# Patient Record
Sex: MALE | Race: WHITE | NOT HISPANIC OR LATINO | Employment: FULL TIME | URBAN - METROPOLITAN AREA
[De-identification: names, ages, dates, MRNs, and addresses within clinical notes are randomized per-mention and may not be internally consistent; named-entity substitution may affect disease eponyms.]

---

## 2018-01-16 ENCOUNTER — TELEPHONE (OUTPATIENT)
Dept: FAMILY MEDICINE | Facility: CLINIC | Age: 28
End: 2018-01-16

## 2018-01-16 ENCOUNTER — NURSE TRIAGE (OUTPATIENT)
Dept: NURSING | Facility: CLINIC | Age: 28
End: 2018-01-16

## 2018-01-16 NOTE — TELEPHONE ENCOUNTER
Patient calling wants to be seen for breathing problem,fast heart rate. Triage not available in clinic caller sent to [facility].

## 2018-01-16 NOTE — TELEPHONE ENCOUNTER
"  Reason for Disposition    [1] Palpitations AND [2] no improvement after following Care Advice    Additional Information    Negative: Passed out (i.e., lost consciousness, collapsed and was not responding)    Negative: Shock suspected (e.g., cold/pale/clammy skin, too weak to stand, low BP, rapid pulse)    Negative: Difficult to awaken or acting confused  (e.g., disoriented, slurred speech)    Negative: Visible sweat on face or sweat dripping down face    Negative: Unable to walk, or can only walk with assistance (e.g., requires support)    Negative: [1] Received SHOCK from implantable cardiac defibrillator AND [2] persisting symptoms (i.e., palpitations, lightheadedness)    Negative: Sounds like a life-threatening emergency to the triager    Negative: Chest pain    Negative: Difficulty breathing    Negative: Dizziness, lightheadedness, or weakness    Negative: [1] Heart beating very rapidly (e.g., > 140 / minute) AND [2] present now  (Exception: during exercise)    Negative: Heart beating very slowly (e.g., < 50 / minute)  (Exception: athlete)    Negative: New or worsened shortness of breath with activity (dyspnea on exertion)    Negative: Patient sounds very sick or weak to the triager    Negative: [1] Heart beating very rapidly (e.g., > 140 / minute) AND [2] not present now  (Exception: during exercise)    Negative: [1] Skipped or extra beat(s) AND [2] increases with exercise or exertion    Negative: [1] Skipped or extra beat(s) AND [2] occurs 4 or more times per minute    Negative: New or worsened ankle swelling    Negative: History of heart disease  (i.e., heart attack, bypass surgery, angina, angioplasty, CHF) (Exception: brief heart beat symptoms that went away and now feels well)    Negative: Age > 60 years (Exception: brief heart beat symptoms that went away and now feels well)    Negative: Taking water pill (i.e., diuretic) or heart medication (e.g., digoxin)    Negative: Wearing a \"holter monitor\" or " "\"cardiac event monitor\"    Negative: [1] Received SHOCK from implantable cardiac defibrillator AND [2] now feels well    Negative: History of hyperthyroidism or taking thyroid medication    Negative: Known or suspected substance abuse (e.g., cocaine, alcohol abuse)    Protocols used: HEART RATE AND HEARTBEAT QUESTIONS-ADULT-    "

## 2018-01-16 NOTE — TELEPHONE ENCOUNTER
"\"I have a weird and erratic heart rate. My breathing has been short.\" Patient reporting, \"I have been getting erratic with mood.\" Reporting \"anxiety symptoms.\"  Stating blood pressure was 140/90 and 120/60 at clinic in [place] in past month. Symptoms starting in past 2-3 months. Patient reporting episodes of irregular heart rate intermittent x 2-3 months, intermittent occurring a couple of times per day.  Reporting when laying down he can feel heart beating in head. Stating he has not counted the heart rate. Denies any current symptoms.  Stating he is over weight and out of shape. Reporting \"normal\" shortness of breath with exertion that he attributes to being out of shape.  Patient is questioning \"Graves Disease\" reporting family history. Stating he has increased water intake.    Vania Layne RN  [facility]      "

## 2018-01-19 ENCOUNTER — OFFICE VISIT (OUTPATIENT)
Dept: FAMILY MEDICINE | Facility: CLINIC | Age: 28
End: 2018-01-19
Payer: COMMERCIAL

## 2018-01-19 VITALS
WEIGHT: 310 LBS | OXYGEN SATURATION: 99 % | RESPIRATION RATE: 18 BRPM | SYSTOLIC BLOOD PRESSURE: 139 MMHG | HEART RATE: 80 BPM | TEMPERATURE: 97.7 F | DIASTOLIC BLOOD PRESSURE: 83 MMHG | BODY MASS INDEX: 43.85 KG/M2

## 2018-01-19 DIAGNOSIS — Z83.49 FAMILY HISTORY OF GRAVES' DISEASE: ICD-10-CM

## 2018-01-19 DIAGNOSIS — R00.2 PALPITATIONS: Primary | ICD-10-CM

## 2018-01-19 LAB
ERYTHROCYTE [DISTWIDTH] IN BLOOD BY AUTOMATED COUNT: 12.7 % (ref 10–15)
HCT VFR BLD AUTO: 41.5 % (ref 40–53)
HGB BLD-MCNC: 14.3 G/DL (ref 13.3–17.7)
MCH RBC QN AUTO: 30.1 PG (ref 26.5–33)
MCHC RBC AUTO-ENTMCNC: 34.5 G/DL (ref 31.5–36.5)
MCV RBC AUTO: 87 FL (ref 78–100)
PLATELET # BLD AUTO: 223 10E9/L (ref 150–450)
RBC # BLD AUTO: 4.75 10E12/L (ref 4.4–5.9)
TSH SERPL DL<=0.005 MIU/L-ACNC: 1.98 MU/L (ref 0.4–4)
WBC # BLD AUTO: 4.8 10E9/L (ref 4–11)

## 2018-01-19 PROCEDURE — 84443 ASSAY THYROID STIM HORMONE: CPT | Performed by: PHYSICIAN ASSISTANT

## 2018-01-19 PROCEDURE — 85027 COMPLETE CBC AUTOMATED: CPT | Performed by: PHYSICIAN ASSISTANT

## 2018-01-19 PROCEDURE — 0296T ZIO PATCH HOLTER: CPT | Performed by: PHYSICIAN ASSISTANT

## 2018-01-19 PROCEDURE — 36415 COLL VENOUS BLD VENIPUNCTURE: CPT | Performed by: PHYSICIAN ASSISTANT

## 2018-01-19 PROCEDURE — 99214 OFFICE O/P EST MOD 30 MIN: CPT | Mod: 25 | Performed by: PHYSICIAN ASSISTANT

## 2018-01-19 PROCEDURE — 93000 ELECTROCARDIOGRAM COMPLETE: CPT | Mod: 59 | Performed by: PHYSICIAN ASSISTANT

## 2018-01-19 ASSESSMENT — ANXIETY QUESTIONNAIRES
GAD7 TOTAL SCORE: 12
7. FEELING AFRAID AS IF SOMETHING AWFUL MIGHT HAPPEN: NOT AT ALL
6. BECOMING EASILY ANNOYED OR IRRITABLE: NEARLY EVERY DAY
3. WORRYING TOO MUCH ABOUT DIFFERENT THINGS: MORE THAN HALF THE DAYS
2. NOT BEING ABLE TO STOP OR CONTROL WORRYING: MORE THAN HALF THE DAYS
1. FEELING NERVOUS, ANXIOUS, OR ON EDGE: MORE THAN HALF THE DAYS
IF YOU CHECKED OFF ANY PROBLEMS ON THIS QUESTIONNAIRE, HOW DIFFICULT HAVE THESE PROBLEMS MADE IT FOR YOU TO DO YOUR WORK, TAKE CARE OF THINGS AT HOME, OR GET ALONG WITH OTHER PEOPLE: SOMEWHAT DIFFICULT
5. BEING SO RESTLESS THAT IT IS HARD TO SIT STILL: MORE THAN HALF THE DAYS

## 2018-01-19 ASSESSMENT — PATIENT HEALTH QUESTIONNAIRE - PHQ9
5. POOR APPETITE OR OVEREATING: SEVERAL DAYS
SUM OF ALL RESPONSES TO PHQ QUESTIONS 1-9: 12

## 2018-01-19 NOTE — PATIENT INSTRUCTIONS
If any results are abnormal and require treatment, a nurse will contact you.    If all results are normal, a letter will be sent to you.    If your anxiety symptoms persist, please return to the clinic to discuss treatment options.    Return sooner if any concerns.

## 2018-01-19 NOTE — LETTER
February 5, 2018    Neeraj Prieto  [address] [address]            Dear Neeraj,    Your recent heart monitoring showed no dangerous arrhythmias. Your heart rhythm was normal throughout the 3 days. No further evaluation of your heart is needed at this time. However, if you develop any new symptoms, please follow up with your primary care provider as soon as possible. If you develop any chest pain, shortness of breath, dizziness, or any other concerning symptoms, please go to the Emergency Department for evaluation. Please call the clinic if you have any questions. Thank you. .      Sincerely,    Christa Zaman PA-C/ks    Results for orders placed or performed in visit on 01/19/18   TSH with free T4 reflex   Result Value Ref Range    TSH 1.98 0.40 - 4.00 mU/L   CBC with platelets   Result Value Ref Range    WBC 4.8 4.0 - 11.0 10e9/L    RBC Count 4.75 4.4 - 5.9 10e12/L    Hemoglobin 14.3 13.3 - 17.7 g/dL    Hematocrit 41.5 40.0 - 53.0 %    MCV 87 78 - 100 fl    MCH 30.1 26.5 - 33.0 pg    MCHC 34.5 31.5 - 36.5 g/dL    RDW 12.7 10.0 - 15.0 %    Platelet Count 223 150 - 450 10e9/L   Zio Patch Holter    Narrative    Patient to wear Zio for 3 days per Christa Zaman PA-C. Pt was instructed on use and how to put the patch on. Pt will be applying patch themselves on Monday night, 01/22/2018.  Jocy Wilkerson MA

## 2018-01-19 NOTE — PROGRESS NOTES
SUBJECTIVE:   Neeraj Prieto is a 27 year old male who presents to clinic today for the following health issues:      Patient c/o irregular pulse, shallow breath, feels like someone is choking him X 6-8 months. Patient also has concerns of anxiety. Patient states family hx of Graves disease.    Irregular heartbeat - occurs when he is laying down to go to sleep. Denies chest pain or dizziness. Denies irregular heartbeat occurring with exertion.    Breathing - was on symbicort in his teenage years, but did not help so stopped. He is using his albuterol inhaler once daily, but denies significant improvement of his breathing symptoms. Describes symptoms as taking short breaths. Intermittent wheezing and slight cough. Notice the breathing when he lays down to go to sleep.     Anxiety - increased stress worsens his anxiety. He is unsure if he has had a panic attack - but has felt panicky. He does worry about money. His brain will not turn off at night. He declines therapy or medication at this time. He wants to first be evaluated for his palpitations and breathing issues. If symptoms of anxiety persist or worsen, he will return to the clinic. Advised to go to local Emergency Department if any thoughts of self harm or harming others. He understood and agreed. He denies any thoughts of self harm or harming others.     He is correlating his anxiety with his breathing symptoms and irregular heartbeat.     He would like to be checked for Grave's disease due to family history. He has heat intolerance. Denies weight changes. Denies skin or hair changes. He denies any other concerns.        Problem list and histories reviewed & adjusted, as indicated.  Additional history: as documented    Patient Active Problem List   Diagnosis     House dust mite allergy     Exercise-induced bronchospasm     Intermittent asthma     Sleep apnea     Past Surgical History:   Procedure Laterality Date     none         Social History   Substance Use  Topics     Smoking status: Never Smoker     Smokeless tobacco: Never Used      Comment: no 2nd hand smoke exposure     Alcohol use Yes      Comment: occasionally     Family History   Problem Relation Age of Onset     Asthma Mother      Allergies Mother          Current Outpatient Prescriptions   Medication Sig Dispense Refill     albuterol (VENTOLIN HFA) 108 (90 BASE) MCG/ACT inhaler Inhale 2 puffs into the lungs every 4 hours as needed 2 Inhaler 1     cefdinir (OMNICEF) 300 MG capsule Take 1 capsule (300 mg) by mouth 2 times daily (Patient not taking: Reported on 1/19/2018) 20 capsule 0     predniSONE (DELTASONE) 20 MG tablet        loratadine (CLARITIN) 10 MG tablet Take 1 tablet (10 mg) by mouth daily (Patient not taking: Reported on 1/19/2018) 30 tablet 11     budesonide-formoterol (SYMBICORT) 160-4.5 MCG/ACT inhaler Inhale 2 puffs into the lungs 2 times daily (Patient not taking: Reported on 1/19/2018) 1 Inhaler 4     ibuprofen (ADVIL) 200 MG tablet Take 400 mg by mouth every 4 hours as needed       budesonide-formoterol (SYMBICORT) 80-4.5 MCG/ACT inhaler Inhale 2 puffs into the lungs 2 times daily (Patient not taking: Reported on 1/19/2018) 1 Inhaler 3     Allergies   Allergen Reactions     Nkda [No Known Drug Allergies]      BP Readings from Last 3 Encounters:   01/19/18 139/83   07/07/16 136/84   07/07/14 120/76    Wt Readings from Last 3 Encounters:   01/19/18 (!) 310 lb (140.6 kg)   07/07/16 298 lb (135.2 kg)   07/07/14 298 lb (135.2 kg)                        Reviewed and updated as needed this visit by clinical staffTobacco  Allergies  Meds  Med Hx  Surg Hx  Fam Hx  Soc Hx      Reviewed and updated as needed this visit by Provider         ROS:  Constitutional, HEENT, cardiovascular, pulmonary, endocrine and psychiatric systems are negative, except as otherwise noted.      OBJECTIVE:   /83  Pulse 80  Temp 97.7  F (36.5  C) (Oral)  Resp 18  Wt (!) 310 lb (140.6 kg)  SpO2 99%  BMI 43.85  kg/m2  Body mass index is 43.85 kg/(m^2).  GENERAL: healthy, alert and no distress  EYES: Eyes grossly normal to inspection  HENT: normal cephalic/atraumatic, ear canals and TM's normal, nose and mouth without ulcers or lesions, oropharynx clear and oral mucous membranes moist  NECK: no adenopathy, no asymmetry, masses, or scars and thyroid normal to palpation  RESP: lungs clear to auscultation - no rales, rhonchi or wheezes  CV: regular rate and rhythm, normal S1 S2, no S3 or S4, no murmur, click or rub, no peripheral edema and peripheral pulses strong  MS: no gross musculoskeletal defects noted, no edema  SKIN: no suspicious lesions or rashes  NEURO: Normal strength and tone, mentation intact and speech normal  PSYCH: mentation appears normal, affect normal/bright    Diagnostic Test Results:  EKG - NSR, no acute ST changes, no previous tracings available  CBC, TSH and holter monitor are pending    ASSESSMENT/PLAN:       ICD-10-CM    1. Palpitations R00.2 EKG 12-lead complete w/read - Clinics     TSH with free T4 reflex     CBC with platelets     Zio Patch Holter     Zio Patch Holter   2. Family history of Graves' disease Z83.49 TSH with free T4 reflex         Patient Instructions   If any results are abnormal and require treatment, a nurse will contact you.    If all results are normal, a letter will be sent to you.    If your anxiety symptoms persist, please return to the clinic to discuss treatment options.    Return sooner if any concerns.           Christa Zaman PA-C  [facility]

## 2018-01-19 NOTE — LETTER
January 23, 2018    Neeraj Prieto  [address] [address]        Dear Neeraj,    The results of your recent tests were normal.  Below is a copy of the results.  It was a pleasure to see you at your last appointment.    If you have any questions or concerns, please call myself or my nurse at [phone].    Sincerely,    Christa Zaman PA-C /    Results for orders placed or performed in visit on 01/19/18   TSH with free T4 reflex   Result Value Ref Range    TSH 1.98 0.40 - 4.00 mU/L   CBC with platelets   Result Value Ref Range    WBC 4.8 4.0 - 11.0 10e9/L    RBC Count 4.75 4.4 - 5.9 10e12/L    Hemoglobin 14.3 13.3 - 17.7 g/dL    Hematocrit 41.5 40.0 - 53.0 %    MCV 87 78 - 100 fl    MCH 30.1 26.5 - 33.0 pg    MCHC 34.5 31.5 - 36.5 g/dL    RDW 12.7 10.0 - 15.0 %    Platelet Count 223 150 - 450 10e9/L   Zio Patch Holter    Narrative    Patient to wear Zio for 3 days per Christa Zaman PA-C. Pt was instructed on use and how to put the patch on. Pt will be applying patch themselves on Monday night, 01/22/2018.  Jocy Wilkerson MA

## 2018-01-19 NOTE — MR AVS SNAPSHOT
"              After Visit Summary   1/19/2018    Neeraj Prieto    MRN: [id number]           Patient Information     Date Of Birth          1990        Visit Information        Provider Department      1/19/2018 12:00 PM Christa Zaman PA-C [facility]        Today's Diagnoses     Palpitations    -  1    Family history of Graves' disease          Care Instructions    If any results are abnormal and require treatment, a nurse will contact you.    If all results are normal, a letter will be sent to you.    If your anxiety symptoms persist, please return to the clinic to discuss treatment options.    Return sooner if any concerns.               Follow-ups after your visit        Future tests that were ordered for you today     Open Future Orders        Priority Expected Expires Ordered    Zio Patch Holter Routine  3/5/2018 1/19/2018            Who to contact     If you have questions or need follow up information about today's clinic visit or your schedule please contact [facility] directly at [phone].  Normal or non-critical lab and imaging results will be communicated to you by MyChart, letter or phone within 4 business days after the clinic has received the results. If you do not hear from us within 7 days, please contact the clinic through [facility] or phone. If you have a critical or abnormal lab result, we will notify you by phone as soon as possible.  Submit refill requests through [facility] or call your pharmacy and they will forward the refill request to us. Please allow 3 business days for your refill to be completed.          Additional Information About Your Visit        [facility] Information     [facility] lets you send messages to your doctor, view your test results, renew your prescriptions, schedule appointments and more. To sign up, go to [email or web address] . Click on \"Log in\" on the left side of the screen, which will take you to the Welcome page. Then click on " "\"Sign up Now\" on the right side of the page.     You will be asked to enter the access code listed below, as well as some personal information. Please follow the directions to create your username and password.     Your access code is: [email or web address]  Expires: 2018 12:47 PM     Your access code will  in 90 days. If you need help or a new code, please call your [facility] or [phone].        Care EveryWhere ID     This is your Care EveryWhere ID. This could be used by other organizations to access your [place] medical records  [id number]        Your Vitals Were     Pulse Temperature Respirations Pulse Oximetry BMI (Body Mass Index)       80 97.7  F (36.5  C) (Oral) 18 99% 43.85 kg/m2        Blood Pressure from Last 3 Encounters:   18 139/83   16 136/84   14 120/76    Weight from Last 3 Encounters:   18 (!) 310 lb (140.6 kg)   16 298 lb (135.2 kg)   14 298 lb (135.2 kg)              We Performed the Following     CBC with platelets     EKG 12-lead complete w/read - Clinics     TSH with free T4 reflex        Primary Care Provider Office Phone # Fax #    Danilo More [phone] [id number] [facility] [address]        Equal Access to Services     [facility]: Hadii aad ku hadasho Soomaali, waaxda luqadaha, qaybta kaalmada adeegyada, joão alvarado . So [facility] [phone].    ATENCIÓN: Si habla español, tiene a jung disposición servicios gratuitos de asistencia lingüística. Llame al [phone].    We comply with applicable federal civil rights laws and [place] laws. We do not discriminate on the basis of race, color, national origin, age, disability, sex, sexual orientation, or gender identity.            Thank you!     Thank you for choosing [facility]  for your care. Our goal is always to provide you with excellent care. Hearing back from our patients is one way we can continue to " improve our services. Please take a few minutes to complete the written survey that you may receive in the mail after your visit with us. Thank you!             Your Updated Medication List - Protect others around you: Learn how to safely use, store and throw away your medicines at www.disposemymeds.org.          This list is accurate as of: 1/19/18 12:47 PM.  Always use your most recent med list.                   Brand Name Dispense Instructions for use Diagnosis    ADVIL 200 MG tablet   Generic drug:  ibuprofen      Take 400 mg by mouth every 4 hours as needed    Acute pharyngitis       albuterol 108 (90 BASE) MCG/ACT Inhaler    VENTOLIN HFA    2 Inhaler    Inhale 2 puffs into the lungs every 4 hours as needed    Mild persistent asthma       * budesonide-formoterol 80-4.5 MCG/ACT Inhaler    SYMBICORT    1 Inhaler    Inhale 2 puffs into the lungs 2 times daily    Mild persistent asthma       * budesonide-formoterol 160-4.5 MCG/ACT Inhaler    SYMBICORT    1 Inhaler    Inhale 2 puffs into the lungs 2 times daily    Mild persistent asthma       cefdinir 300 MG capsule    OMNICEF    20 capsule    Take 1 capsule (300 mg) by mouth 2 times daily    Acute nasopharyngitis       loratadine 10 MG tablet    CLARITIN    30 tablet    Take 1 tablet (10 mg) by mouth daily    Perennial allergic rhinitis       predniSONE 20 MG tablet    DELTASONE          * Notice:  This list has 2 medication(s) that are the same as other medications prescribed for you. Read the directions carefully, and ask your doctor or other care provider to review them with you.

## 2018-01-22 ENCOUNTER — MEDICAL CORRESPONDENCE (OUTPATIENT)
Facility: CLINIC | Age: 28
End: 2018-01-22
Payer: COMMERCIAL

## 2018-01-22 PROCEDURE — 0298T ZZC EXT ECG > 48HR TO 21 DAY REVIEW AND INTERPRETATN: CPT | Performed by: INTERNAL MEDICINE

## 2018-01-23 ASSESSMENT — ANXIETY QUESTIONNAIRES: GAD7 TOTAL SCORE: 12

## 2023-06-19 ENCOUNTER — LAB (OUTPATIENT)
Dept: LAB | Facility: CLINIC | Age: 33
End: 2023-06-19
Payer: COMMERCIAL

## 2023-06-19 DIAGNOSIS — Z31.41 ENCOUNTER FOR SPERM COUNT FOR FERTILITY TESTING: ICD-10-CM

## 2023-06-19 LAB
ABNORMAL SPERM MORPHOLOGY: 100
ABSTINENCE DAYS: 3 DAYS (ref 2–7)
AGGLUTINATION: NO
ANALYSIS TEMP - CENTIGRADE: 21 CENTIGRADE
COLLECTION METHOD: ABNORMAL
COLLECTION SITE: ABNORMAL
CONSENT TO RELEASE TO PARTNER: YES
DAL- RECEIVED TIME: ABNORMAL
HEAD DEFECT: 100 %
IMMOTILE: 71 %
LIQUEFIED: YES
MIDPIECE DEFECT: 48 %
NON-PROGRESSIVE MOTILITY: 4 %
NORMAL SPERM MORPHOLOGY: 0 % NORMAL FORMS
PROGRESSIVE MOTILITY: 25 %
ROUND CELLS: 0.7 MILLION/ML
SPECIMEN PH: 7.2 PH
SPECIMEN VOLUME: 2 ML
SPERM CONCENTRATION: 28.1 MILLION/ML
TAIL DEFECT: 23 %
TIME OF ANALYSIS: ABNORMAL
TOTAL PROGRESSIVE MOTILE NUMBER: 14 MILLION
TOTAL SPERM NUMBER: 56 MILLION
VISCOUS: NO
VITALITY: 62 %

## 2023-06-19 PROCEDURE — 89322 SEMEN ANAL STRICT CRITERIA: CPT

## 2024-04-10 ENCOUNTER — LAB (OUTPATIENT)
Dept: LAB | Facility: CLINIC | Age: 34
End: 2024-04-10
Payer: COMMERCIAL

## 2024-04-10 DIAGNOSIS — R86.8 TERATOSPERMIA: ICD-10-CM

## 2024-04-10 DIAGNOSIS — Z31.69 INFERTILITY COUNSELING: ICD-10-CM

## 2024-04-10 DIAGNOSIS — E29.1 HYPOGONADISM IN MALE: ICD-10-CM

## 2024-04-10 DIAGNOSIS — Z31.41 FERTILITY TESTING: ICD-10-CM

## 2024-04-10 PROCEDURE — 89322 SEMEN ANAL STRICT CRITERIA: CPT

## 2024-04-11 LAB
ABNORMAL SPERM MORPHOLOGY: 100
ABSTINENCE DAYS: ABNORMAL
AGGLUTINATION: NO
ANALYSIS TEMP - CENTIGRADE: 21 CENTIGRADE
COLLECTION METHOD: ABNORMAL
COLLECTION SITE: ABNORMAL
CONSENT TO RELEASE TO PARTNER: YES
DAL- RECEIVED TIME: ABNORMAL
HEAD DEFECT: 100 %
IMMOTILE: 74 %
LIQUEFIED: YES
MIDPIECE DEFECT: 49 %
NON-PROGRESSIVE MOTILITY: 6 %
NORMAL SPERM MORPHOLOGY: 0 % NORMAL FORMS
PROGRESSIVE MOTILITY: 20 %
ROUND CELLS: 0.6 MILLION/ML
SPECIMEN PH: 7.2 PH
SPECIMEN VOLUME: 4.3 ML
SPERM CONCENTRATION: 31.4 MILLION/ML
TAIL DEFECT: 22 %
TIME OF ANALYSIS: ABNORMAL
TOTAL PROGRESSIVE MOTILE NUMBER: 27 MILLION
TOTAL SPERM NUMBER: 135 MILLION
VISCOUS: NO
VITALITY: 41 %

## 2024-04-20 ENCOUNTER — HEALTH MAINTENANCE LETTER (OUTPATIENT)
Age: 34
End: 2024-04-20

## 2025-05-11 ENCOUNTER — HEALTH MAINTENANCE LETTER (OUTPATIENT)
Age: 35
End: 2025-05-11